# Patient Record
Sex: FEMALE | Race: AMERICAN INDIAN OR ALASKA NATIVE
[De-identification: names, ages, dates, MRNs, and addresses within clinical notes are randomized per-mention and may not be internally consistent; named-entity substitution may affect disease eponyms.]

---

## 2020-07-14 ENCOUNTER — HOSPITAL ENCOUNTER (EMERGENCY)
Dept: HOSPITAL 5 - ED | Age: 30
Discharge: HOME | End: 2020-07-14
Payer: MEDICAID

## 2020-07-14 VITALS — SYSTOLIC BLOOD PRESSURE: 108 MMHG | DIASTOLIC BLOOD PRESSURE: 65 MMHG

## 2020-07-14 DIAGNOSIS — M94.0: Primary | ICD-10-CM

## 2020-07-14 LAB
BILIRUB UR QL STRIP: (no result)
BLOOD UR QL VISUAL: (no result)
MUCOUS THREADS #/AREA URNS HPF: (no result) /HPF
PH UR STRIP: 5 [PH] (ref 5–7)
PROT UR STRIP-MCNC: (no result) MG/DL
RBC #/AREA URNS HPF: 30 /HPF (ref 0–6)
UROBILINOGEN UR-MCNC: 2 MG/DL (ref ?–2)
WBC #/AREA URNS HPF: 3 /HPF (ref 0–6)

## 2020-07-14 PROCEDURE — 71046 X-RAY EXAM CHEST 2 VIEWS: CPT

## 2020-07-14 PROCEDURE — 99283 EMERGENCY DEPT VISIT LOW MDM: CPT

## 2020-07-14 PROCEDURE — 81025 URINE PREGNANCY TEST: CPT

## 2020-07-14 PROCEDURE — 81001 URINALYSIS AUTO W/SCOPE: CPT

## 2020-07-14 NOTE — EMERGENCY DEPARTMENT REPORT
ED General Adult HPI





- General


Chief complaint: Back Pain/Injury


Stated complaint: BACK PAIN


Time Seen by Provider: 20 19:38


Source: patient


Mode of arrival: Ambulatory


Limitations: No Limitations





- History of Present Illness


Initial comments: 





29-year-old F American female presents emergency department complaining of 

atraumatic left flank/back pain which is worse with deep breaths and and certain

range of motion.  She reports no trauma peer reports no hemoptysis no 

hematemesis no hematochezia but does have an occasional cough.  She reports no 

hematuria or dysuria no constipation or diarrhea.  She reports no palliative 

factors.


-: days(s) (2)


Location: back


Radiation: non-radiation


Quality: aching, sharp, dull


Consistency: constant


Improves with: none


Worsens with: none


Associated Symptoms: nausea/vomiting.  denies: confusion, chest pain, cough, 

diaphoresis, loss of appetite, malaise, syncope, weakness


Treatments Prior to Arrival: none





- Related Data


                                  Previous Rx's











 Medication  Instructions  Recorded  Last Taken  Type


 


Ketorolac [Toradol] 10 mg PO Q6H PRN #14 tablet 20 Unknown Rx











                                    Allergies











Allergy/AdvReac Type Severity Reaction Status Date / Time


 


metronidazole [From Flagyl] Allergy  Unknown Verified 20 19:40


 


valacyclovir [From Valtrex] Allergy  Unknown Verified 20 19:40














ED Review of Systems


ROS: 


Stated complaint: BACK PAIN


Other details as noted in HPI





Comment: All other systems reviewed and negative





ED Past Medical Hx





- Past Medical History


Previous Medical History?: No





- Surgical History


Past Surgical History?: No





- Social History


Smoking Status: Never Smoker





- Medications


Home Medications: 


                                Home Medications











 Medication  Instructions  Recorded  Confirmed  Last Taken  Type


 


Ketorolac [Toradol] 10 mg PO Q6H PRN #14 tablet 20  Unknown Rx














ED Physical Exam





- General


Limitations: No Limitations


General appearance: alert, in no apparent distress





- Head


Head exam: Present: atraumatic, normocephalic





- Eye


Eye exam: Present: normal appearance, PERRL, EOMI.  Absent: scleral icterus, 

conjunctival injection, periorbital swelling, periorbital tenderness


Pupils: Present: normal accommodation





- ENT


ENT exam: Present: normal exam, mucous membranes moist, TM's normal bilaterally





- Neck


Neck exam: Present: normal inspection, full ROM.  Absent: tenderness, 

lymphadenopathy, thyromegaly





- Respiratory


Respiratory exam: Present: normal lung sounds bilaterally, chest wall tenderness

 (To the left flank in the area of serrratus ).  Absent: respiratory distress, 

wheezes, rales, accessory muscle use, decreased breath sounds





- Cardiovascular


Cardiovascular Exam: Present: regular rate, normal rhythm.  Absent: systolic 

murmur, diastolic murmur, rubs, gallop





- GI/Abdominal


GI/Abdominal exam: Present: soft, normal bowel sounds.  Absent: distended, 

tenderness, hyperactive bowel sounds, hypoactive bowel sounds, organomegaly, 

mass





- Extremities Exam


Extremities exam: Present: normal inspection, full ROM, normal capillary refill.

  Absent: pedal edema, joint swelling





- Back Exam


Back exam: Present: normal inspection





- Neurological Exam


Neurological exam: Present: alert, oriented X3





- Psychiatric


Psychiatric exam: Present: normal affect, normal mood





- Skin


Skin exam: Present: warm, dry, intact, normal color.  Absent: rash





ED Course


                                   Vital Signs











  20





  19:38


 


Temperature 98.4 F


 


Pulse Rate 77


 


Respiratory 16





Rate 


 


Blood Pressure 108/65


 


O2 Sat by Pulse 100





Oximetry 














ED Medical Decision Making





- Radiology Data


Radiology results: report reviewed





[facility] 


[address] [place] [address] 





XRay Report 


Signed 





 Patient: KARMA RIVERO MR#: [id number] 


 [id number] 


 : 1990 Acct:[id number] 





 Age/Sex: 29 / F ADM Date: 20 





 Loc: ED 


 Attending Dr: 








 Ordering Physician: GEORGE BAUGH 


 Date of Service: 20 


 Procedure(s): XR chest routine 2V 


 Accession Number(s): [id number] 





 cc: GEORGE BAUGH 





 Fluoro Time In Minutes: 





 CHEST 2 VIEWS 





INDICATION / CLINICAL INFORMATION: 


Cough and left flank pain. 





COMPARISON: 


None available. 





FINDINGS: 





SUPPORT DEVICES: None. 


HEART / MEDIASTINUM: The heart size and pulmonary vasculature are normal. 


LUNGS / PLEURA: No significant pulmonary or pleural abnormality. No 

pneumothorax. 


ADDITIONAL FINDINGS: There are bilateral nipple piercings. 





IMPRESSION: No acute findings. 





Signer Name: Eliseo Engle MD 


Signed: 2020 10:46 PM 


Workstation Name: [id number] 








 Transcribed By: RT 


 Dictated By: Eliseo Engle MD 


 Electronically Authenticated By: Eliseo Engle MD 


 Signed Date/Time: 20 











 DD/DT: 20 


 TD/TT: 


Critical care attestation.: 


If time is entered above; I have spent that time in minutes in the direct care 

of this critically ill patient, excluding procedure time.








ED Disposition


Clinical Impression: 


 Costochondral pain





Disposition: - TO HOME OR SELFCARE


Is pt being admited?: No


Does the pt Need Aspirin: No


Condition: Stable


Instructions:  Chest Pain (ED), Costochondritis (ED)


Prescriptions: 


Ketorolac [Toradol] 10 mg PO Q6H PRN #14 tablet


 PRN Reason: Pain


Referrals: 


[facility],MD [Primary Care Provider] - 3-5 Days


[facility] [Provider Group] - 3-5 Days

## 2020-07-14 NOTE — XRAY REPORT
CHEST 2 VIEWS



INDICATION / CLINICAL INFORMATION:

Cough and left flank pain.



COMPARISON: 

None available.



FINDINGS:



SUPPORT DEVICES: None.

HEART / MEDIASTINUM: The heart size and pulmonary vasculature are normal. 

LUNGS / PLEURA: No significant pulmonary or pleural abnormality. No pneumothorax. 

ADDITIONAL FINDINGS: There are bilateral nipple piercings.



IMPRESSION: No acute findings.



Signer Name: Eliseo Engle MD 

Signed: 7/14/2020 10:46 PM

Workstation Name: [id number]

## 2022-05-25 NOTE — EVENT NOTE
ED Screening Note


ED Screening Note: 


VAGUE CO LBP


NOT SURE IF HER KIDNEY





SHARP PAIN 





NO FALL OR TRAUMA





This initial assessment/diagnostic orders/clinical plan/treatment(s) is/are 

subject to change based on patients health status, clinical progression and re-

assessment by fellow clinical providers in the ED. Further treatment and workup 

at subsequent clinical providers discretion. Patient/guardian urged not to elope

from the ED as their condition may be serious if not clinically assessed and 

managed. 





Initial orders include: 


UA Noted. Can we reach out to pt and see if she would like us to place referral through [facility] or [facility] services?